# Patient Record
Sex: FEMALE | Race: WHITE | NOT HISPANIC OR LATINO | URBAN - NONMETROPOLITAN AREA
[De-identification: names, ages, dates, MRNs, and addresses within clinical notes are randomized per-mention and may not be internally consistent; named-entity substitution may affect disease eponyms.]

---

## 2017-03-17 ENCOUNTER — OFFICE VISIT (OUTPATIENT)
Dept: OBSTETRICS AND GYNECOLOGY | Facility: CLINIC | Age: 27
End: 2017-03-17

## 2017-03-17 VITALS
RESPIRATION RATE: 16 BRPM | SYSTOLIC BLOOD PRESSURE: 100 MMHG | HEIGHT: 64 IN | DIASTOLIC BLOOD PRESSURE: 60 MMHG | HEART RATE: 64 BPM | BODY MASS INDEX: 21.92 KG/M2 | WEIGHT: 128.4 LBS

## 2017-03-17 DIAGNOSIS — Z30.49 ENCOUNTER FOR SURVEILLANCE OF NUVARING: ICD-10-CM

## 2017-03-17 DIAGNOSIS — Z76.89 ENCOUNTER TO ESTABLISH CARE: Primary | ICD-10-CM

## 2017-03-17 PROCEDURE — 99213 OFFICE O/P EST LOW 20 MIN: CPT | Performed by: NURSE PRACTITIONER

## 2017-03-17 NOTE — PROGRESS NOTES
Subjective   Merced Angel is a 26 y.o. female.     History of Present Illness   Pt presents to establish care. . . She is on Nuva Ring. With loss of insurance for a couple of months, she has been using condoms on for a couple of months. She has hx of oligomenorrhea with 3-4 menses per year without birth control. She has not had a period since stopping Nuva Ring. Pt home UPT negative and pt is instructed to restart Nuva Ring immediately.     The following portions of the patient's history were reviewed and updated as appropriate: allergies, current medications, past family history, past medical history, past social history, past surgical history and problem list.    Review of Systems   Constitutional: Negative.    Respiratory: Negative.    Cardiovascular: Negative.    Gastrointestinal: Positive for constipation and diarrhea.        IBS   Endocrine: Negative.    Genitourinary: Positive for menstrual problem (oligomenorrhea). Negative for pelvic pain and vaginal discharge.   Psychiatric/Behavioral: Negative.        Objective   Physical Exam   Constitutional: She is oriented to person, place, and time. She appears well-developed and well-nourished.   Cardiovascular: Normal rate, regular rhythm and normal heart sounds.    Pulmonary/Chest: Effort normal and breath sounds normal.   Genitourinary:   Genitourinary Comments: Exam deferred   Neurological: She is alert and oriented to person, place, and time.   Psychiatric: She has a normal mood and affect. Her behavior is normal.   Vitals reviewed.      Assessment/Plan   Merced was seen today for establish [facility].    Diagnoses and all orders for this visit:    Encounter to establish care    Encounter for surveillance of nuvaring    Pt has at least 2 refills left of NuvaRing. She is instructed to quick start and informed that BTB may occur in the first 2-3 cycles. Use back up for first nuva ring. She verbalizes understanding. Pt is due for pap test in 1  year.

## 2017-07-07 RX ORDER — ETONOGESTREL/ETHINYL ESTRADIOL .12-.015MG
RING, VAGINAL VAGINAL
Qty: 1 EACH | Refills: 12 | Status: SHIPPED | OUTPATIENT
Start: 2017-07-07